# Patient Record
Sex: MALE | Race: AMERICAN INDIAN OR ALASKA NATIVE
[De-identification: names, ages, dates, MRNs, and addresses within clinical notes are randomized per-mention and may not be internally consistent; named-entity substitution may affect disease eponyms.]

---

## 2020-02-12 ENCOUNTER — HOSPITAL ENCOUNTER (EMERGENCY)
Dept: HOSPITAL 5 - ED | Age: 25
Discharge: HOME | End: 2020-02-12
Payer: COMMERCIAL

## 2020-02-12 VITALS — DIASTOLIC BLOOD PRESSURE: 74 MMHG | SYSTOLIC BLOOD PRESSURE: 121 MMHG

## 2020-02-12 DIAGNOSIS — F12.90: ICD-10-CM

## 2020-02-12 DIAGNOSIS — R09.89: ICD-10-CM

## 2020-02-12 DIAGNOSIS — J06.9: Primary | ICD-10-CM

## 2020-02-12 DIAGNOSIS — J45.909: ICD-10-CM

## 2020-02-12 DIAGNOSIS — R05: ICD-10-CM

## 2020-02-12 DIAGNOSIS — Z79.899: ICD-10-CM

## 2020-02-12 DIAGNOSIS — F17.200: ICD-10-CM

## 2020-02-12 PROCEDURE — 99283 EMERGENCY DEPT VISIT LOW MDM: CPT

## 2020-02-12 PROCEDURE — 71046 X-RAY EXAM CHEST 2 VIEWS: CPT

## 2020-02-12 NOTE — XRAY REPORT
CHEST 2 VIEWS 



INDICATION: 

pain.



COMPARISON: 

None.



FINDINGS:

Support devices: None.



Heart: Within normal limits. 

Pulmonary vasculature: Normal.

Lungs/pleura: No acute air space or interstitial disease.  No pneumothorax.



Additional findings: None.



IMPRESSION:

1. No acute findings.



Signer Name: Kavon Zelaya MD 

Signed: 2/12/2020 8:35 AM

 Workstation Name: [id number]

## 2020-02-12 NOTE — EMERGENCY DEPARTMENT REPORT
Minor Respiratory





- HPI


Chief Complaint: Upper Respiratory Infection


Stated Complaint: FLU SYMPTOMS


Time Seen by Provider: 02/12/20 07:19


Duration: 4 Days


Severity: moderate


Minor Respiratory: Yes Sore Throat, Yes Able to Tolerate Fluids, Yes Cough, Yes 

Chest Pain, Yes Shortness of Breath, No Rhinorrhea, No Ear Pain, No Sick 

Contacts, No Hemoptysis, No Fever


Other History: This 25-year-old male who presents with cough, runny nose, 

headache and congestion with mucus productive coughing x4 days.  Patient states 

symptoms not getting better.  Patient denies fever but admits to chills, nausea,

chest pain with coughing and shortness of breath.  Patient denies any history of

asthma or any respiratory illness.





ED Review of Systems


ROS: 


Stated complaint: FLU SYMPTOMS


Other details as noted in HPI





Comment: All other systems reviewed and negative





ED Past Medical Hx





- Past Medical History


Previous Medical History?: No





- Surgical History


Past Surgical History?: No





- Social History


Smoking Status: Current Some Day Smoker


Substance Use Type: Marijuana





- Medications


Home Medications: 


                                Home Medications











 Medication  Instructions  Recorded  Confirmed  Last Taken  Type


 


Cyclobenzaprine [Flexeril] 10 mg PO QHS PRN #20 tablet 02/12/20  Unknown Rx


 


Ibuprofen [Motrin 800 MG tab] 800 mg PO Q8HR PRN #30 tablet 02/12/20  Unknown Rx














Minor Respiratory Exam





- Exam


General: 


Vital signs noted. No distress. Alert and acting appropriately.





HEENT: Yes Moist Mucous Membranes, No Pharyngeal Erythema, No Pharyngeal 

Exudates, No Rhinorrhea, No Conjuctival Injection, No Frontal Tenderness, No 

Maxillary Tenderness


Ear: Neither TM Bulge, Neither TM Erythema, Neither EAC Pain, Neither EAC 

Discharge


Neck: Yes Supple, No Adenopathy


Lungs: Yes Good Air Exchange, No Wheezes, No Ronchi, No Stridor, No Cough, No 

Labored Respirations, No Retractions, No Use of Accessory Muscles, No Other 

Abnormal Lung Sounds


Heart: Yes Regular, No Murmur


Abdomen: Yes Normal Bowel Sounds, No Tenderness, No Peritoneal Signs


Skin: No Rash, No Edema


Neurologic: 


Alert and oriented, no deficits.








Musculoskeletal: 


Unremarkable.











ED Course


                                   Vital Signs











  02/12/20





  05:30


 


Temperature 99.6 F


 


Pulse Rate 105 H


 


Respiratory 18





Rate 


 


Blood Pressure 123/75


 


O2 Sat by Pulse 100





Oximetry 














ED Medical Decision Making





- Radiology Data


Radiology results: report reviewed, image reviewed


INDICATION: pain. COMPARISON: None. FINDINGS: Support devices: None. Heart: 

Within normal limits. Pulmonary vasculature: Normal. Lungs/pleura: No acute air 

space or interstitial disease. No pneumothorax. Additional findings: None. 








IMPRESSION: 1. No acute findings.





 Signer Name: Kavon Lozano MD Signed: 2/12/2020 8:35 AM Workstation Name: 

[id number] Transcribed By: REF Dictated By: KAVON LOZANO MD 

Electronically Authenticated By: KAVON LOZANO MD Signed Date/Time: 

02/12/20 0835 








- Medical Decision Making


25-year-old male presents with flulike symptoms.


No fever during the ED stay.


Discussed with patient symptomatic relief with over-the-counter medications.  


Discussed continue Tylenol and Motrin as needed for fever and pain.


Discussed increase fluids and diet intake.


Discussed rest much needed.


Discussed daily vitamin C for immune booster.


Discussed follow-up with primary care physician in 3-5 days.


Patient verbally states he understands and will comply the following 

instructions and follow-up


Vital signs stable.  Patient is in no acute distress


Critical care attestation.: 


If time is entered above; I have spent that time in minutes in the direct care 

of this critically ill patient, excluding procedure time.








ED Disposition


Clinical Impression: 


 Upper respiratory infection with cough and congestion, Acute bronchitis





Disposition: DC-01 TO HOME OR SELFCARE


Is pt being admited?: No


Does the pt Need Aspirin: No


Condition: Stable


Instructions:  Acute Bronchitis (ED), Viral Syndrome (ED)


Additional Instructions: 


Make sure to follow up with the primary care physician as discussed.


 patient symptomatic relief with over-the-counter medications.  


 continue  Motrin as needed for fever and pain.


 increase fluids and diet intake.


Take Robitussin for coughing,


If you have any worsening symptoms or develop new symptoms please return to ED 

immediately.


Referrals: 


PRIMARY CARE,MD [Primary Care Provider] - 3-5 Days


[facility] [Outside] - 3-5 Days


[facility] [Outside] - 3-5 Days


Forms:  Work/School Release Form(ED)


Time of Disposition: 09:02